# Patient Record
Sex: FEMALE
[De-identification: names, ages, dates, MRNs, and addresses within clinical notes are randomized per-mention and may not be internally consistent; named-entity substitution may affect disease eponyms.]

---

## 2021-12-28 ENCOUNTER — NURSE TRIAGE (OUTPATIENT)
Dept: OTHER | Facility: CLINIC | Age: 86
End: 2021-12-28

## 2021-12-28 NOTE — TELEPHONE ENCOUNTER
Subjective: Caller states \"she's got night sweats and she's moaning\"     Current Symptoms: night sweats, moaning, feels cold to touch, is able to answer questions appropriately, denies pain, feels cold and clammy but reports feeling hot. Onset: tonight    Associated Symptoms: moaning, denies pain or sob, hair is wet from sweating    Pain Severity: denies pain    Temperature: unclear    What has been tried: similar episode last night, put her back to bed and she was able to sleep and wake up the next day feeling a little better, but now happening again and daughters are very concerned with symptoms. Recommended disposition: Go to the ED    Care advice provided, patient verbalizes understanding; denies any other questions or concerns; instructed to call back for any new or worsening symptoms. Patient/caller proceeding to the closest Emergency Department    This triage is a result of a call to [address] [place] [address]. Please do not respond to the triage nurse through this encounter. Any subsequent communication should be directly with the patient.       Reason for Disposition   Nursing judgment or information in reference    Protocols used: NO GUIDELINE AVAILABLE - SICK ADULT CALL-ADULT-